# Patient Record
Sex: MALE | Race: WHITE | HISPANIC OR LATINO | Employment: FULL TIME | URBAN - METROPOLITAN AREA
[De-identification: names, ages, dates, MRNs, and addresses within clinical notes are randomized per-mention and may not be internally consistent; named-entity substitution may affect disease eponyms.]

---

## 2020-02-08 ENCOUNTER — OFFICE VISIT (OUTPATIENT)
Dept: URGENT CARE | Facility: CLINIC | Age: 55
End: 2020-02-08
Payer: COMMERCIAL

## 2020-02-08 VITALS
DIASTOLIC BLOOD PRESSURE: 76 MMHG | SYSTOLIC BLOOD PRESSURE: 132 MMHG | TEMPERATURE: 97.7 F | RESPIRATION RATE: 16 BRPM | HEART RATE: 70 BPM | WEIGHT: 250 LBS | OXYGEN SATURATION: 97 % | BODY MASS INDEX: 40.18 KG/M2 | HEIGHT: 66 IN

## 2020-02-08 DIAGNOSIS — M25.561 ACUTE PAIN OF RIGHT KNEE: ICD-10-CM

## 2020-02-08 DIAGNOSIS — M70.50 PES ANSERINE BURSITIS: ICD-10-CM

## 2020-02-08 PROCEDURE — 99204 OFFICE O/P NEW MOD 45 MIN: CPT | Performed by: FAMILY MEDICINE

## 2020-02-08 RX ORDER — PREDNISONE 20 MG/1
TABLET ORAL
Qty: 9 TAB | Refills: 0 | Status: SHIPPED | OUTPATIENT
Start: 2020-02-08

## 2020-02-09 NOTE — PROGRESS NOTES
Chief Complaint:    Chief Complaint   Patient presents with   • Knee Pain     x3 days, (R) knee, painful to walk and bend        History of Present Illness:    Visit done with [employer] translation service. Wife present. This is a new problem. He has pain in right knee, pes anserine bursa location x 3 days. He feels problem may be due to having to go up and down stairs a lot as recently he had to do this. However, he is no longer having to do this. He took Aleve for this without significant help. No swelling in knee. No injury or trauma. He has had pain in right knee in past due to playing tennis. This was about 5 years ago and treated with injection.      Review of Systems:    Constitutional: Negative for fever, chills, and diaphoresis.   Eyes: Negative for change in vision, photophobia, pain, redness, and discharge.  ENT: Negative for ear pain, ear discharge, hearing loss, tinnitus, nasal congestion, nosebleeds, and sore throat.    Respiratory: Negative for cough, hemoptysis, sputum production, shortness of breath, wheezing, and stridor.    Cardiovascular: Negative for chest pain, palpitations, orthopnea, claudication, leg swelling, and PND.   Gastrointestinal: Negative for abdominal pain, nausea, vomiting, diarrhea, constipation, blood in stool, and melena.   Genitourinary: Negative for dysuria, urinary urgency, urinary frequency, hematuria, and flank pain.   Musculoskeletal: See HPI.  Skin: Negative for rash and itching.   Neurological: Negative for dizziness, tingling, tremors, sensory change, speech change, focal weakness, seizures, loss of consciousness, and headaches.   Endo: Negative for polydipsia.   Heme: Does not bruise/bleed easily.   Psychiatric/Behavioral: Negative for depression, suicidal ideas, hallucinations, memory loss and substance abuse. The patient is not nervous/anxious and does not have insomnia.        Past Medical History:    History reviewed. No pertinent past medical history.    Past Surgical  "History:    History reviewed. No pertinent surgical history.    Social History:    Social History     Socioeconomic History   • Marital status:      Spouse name: Not on file   • Number of children: Not on file   • Years of education: Not on file   • Highest education level: Not on file   Occupational History   • Not on file   Social Needs   • Financial resource strain: Not on file   • Food insecurity:     Worry: Not on file     Inability: Not on file   • Transportation needs:     Medical: Not on file     Non-medical: Not on file   Tobacco Use   • Smoking status: Never Smoker   • Smokeless tobacco: Never Used   Substance and Sexual Activity   • Alcohol use: Not on file   • Drug use: Not on file   • Sexual activity: Not on file   Lifestyle   • Physical activity:     Days per week: Not on file     Minutes per session: Not on file   • Stress: Not on file   Relationships   • Social connections:     Talks on phone: Not on file     Gets together: Not on file     Attends Christianity service: Not on file     Active member of club or organization: Not on file     Attends meetings of clubs or organizations: Not on file     Relationship status: Not on file   • Intimate partner violence:     Fear of current or ex partner: Not on file     Emotionally abused: Not on file     Physically abused: Not on file     Forced sexual activity: Not on file   Other Topics Concern   • Not on file   Social History Narrative   • Not on file     Family History:    History reviewed. No pertinent family history.    Medications:    No current outpatient medications on file prior to visit.     No current facility-administered medications on file prior to visit.      Allergies:    No Known Allergies      Vitals:    Vitals:    02/08/20 1731   BP: 132/76   Patient Position: Sitting   Pulse: 70   Resp: 16   Temp: 36.5 °C (97.7 °F)   TempSrc: Temporal   SpO2: 97%   Weight: 113.4 kg (250 lb)   Height: 1.676 m (5' 6\")       Physical " Exam:    Constitutional: Vital signs reviewed. Appears well-developed and well-nourished. No acute distress.   Eyes: Sclera white, conjunctivae clear.  ENT: External ears normal. Hearing normal.  Cardiovascular: Peripheral pulses 2+.    Pulmonary/Chest: Respirations non-labored.  Musculoskeletal: Mildly decreased right knee flexion and bilateral rotation due to pain in right pes anserine bursa region. Normal right knee extension. Normal gait. No tenderness to palpation. No muscular atrophy or weakness.  Neurological: Alert and oriented to person, place, and time. Muscle tone normal. Coordination normal. Light touch and sensation normal.   Skin: No rashes or lesions. Warm, dry, normal turgor.  Psychiatric: Normal mood and affect. Behavior is normal. Judgment and thought content normal.       Assessment / Plan:    1. Acute pain of right knee  - predniSONE (DELTASONE) 20 MG Tab; 2 TABS BY MOUTH ONCE A DAY ON DAYS 1-3, 1 TAB ONCE A DAY ON DAYS 4-6. TAKE WITH FOOD.  Dispense: 9 Tab; Refill: 0  - Diclofenac Sodium 1 % Gel; APPLY 2-4 GRAMS TO PAINFUL AREA EVERY 6 HOURS ONLY IF NEEDED FOR PAIN TO HELP INFLAMMATION.  Dispense: 100 g; Refill: 2    2. Pes anserine bursitis  - predniSONE (DELTASONE) 20 MG Tab; 2 TABS BY MOUTH ONCE A DAY ON DAYS 1-3, 1 TAB ONCE A DAY ON DAYS 4-6. TAKE WITH FOOD.  Dispense: 9 Tab; Refill: 0  - Diclofenac Sodium 1 % Gel; APPLY 2-4 GRAMS TO PAINFUL AREA EVERY 6 HOURS ONLY IF NEEDED FOR PAIN TO HELP INFLAMMATION.  Dispense: 100 g; Refill: 2      Discussed with them DDX, management options, and risks, benefits, and alternatives to treatment plan agreed upon.    Rec'd relative rest.    Declines Toradol IM.     Agreeable to medications prescribed for anti-inflammatory effect.    Discussed expected course of duration, time for improvement, and to seek follow-up in Emergency Room, urgent care, or with PCP if getting worse at any time or not improving within expected time frame.